# Patient Record
Sex: FEMALE | Race: AMERICAN INDIAN OR ALASKA NATIVE | NOT HISPANIC OR LATINO | URBAN - METROPOLITAN AREA
[De-identification: names, ages, dates, MRNs, and addresses within clinical notes are randomized per-mention and may not be internally consistent; named-entity substitution may affect disease eponyms.]

---
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
## 2024-02-01 NOTE — ED BEHAVIORAL HEALTH ASSESSMENT NOTE - FUND OF KNOWLEDGE
Assessment & Plan     1. Hyperlipidemia LDL goal <100  Continue simvastatin   - Lipid Profile; Future  - Comprehensive metabolic panel; Future    2. Hypothyroidism due to acquired atrophy of thyroid  Continue levothyroxine   - TSH with free T4 reflex; Future    3. Restless legs syndrome  Continue current plan   - pramipexole (MIRAPEX) 0.5 MG tablet; Take 1 tablet (0.5 mg) by mouth at bedtime  Dispense: 90 tablet; Refill: 1    4. Menopausal syndrome (hot flashes)  Continue current plan     5. On statin therapy  - Comprehensive metabolic panel; Future    6. Primary insomnia  - hydrOXYzine HCl (ATARAX) 25 MG tablet; Take 1-2 tablets (25-50 mg) by mouth nightly as needed (insomnia)  Dispense: 60 tablet; Refill: 1  7. Encounter for screening mammogram for malignant neoplasm of breast  - MA Screen Bilateral w/Ozzie; Future      Return in about 6 months (around 8/1/2024) for thyroid, lipids.    Zoë Romero,   Certified Adult Nurse Practitioner  [phone]     Normal